# Patient Record
Sex: MALE | Race: WHITE | NOT HISPANIC OR LATINO | Employment: UNEMPLOYED | URBAN - METROPOLITAN AREA
[De-identification: names, ages, dates, MRNs, and addresses within clinical notes are randomized per-mention and may not be internally consistent; named-entity substitution may affect disease eponyms.]

---

## 2024-11-19 ENCOUNTER — HOSPITAL ENCOUNTER (OUTPATIENT)
Facility: HOSPITAL | Age: 34
Setting detail: OBSERVATION
Discharge: HOME | End: 2024-11-20
Attending: STUDENT IN AN ORGANIZED HEALTH CARE EDUCATION/TRAINING PROGRAM | Admitting: FAMILY MEDICINE
Payer: COMMERCIAL

## 2024-11-19 ENCOUNTER — APPOINTMENT (OUTPATIENT)
Dept: GENERAL RADIOLOGY | Facility: HOSPITAL | Age: 34
End: 2024-11-19
Payer: COMMERCIAL

## 2024-11-19 DIAGNOSIS — E11.10 DIABETIC KETOACIDOSIS WITHOUT COMA ASSOCIATED WITH TYPE 2 DIABETES MELLITUS: Primary | ICD-10-CM

## 2024-11-19 LAB
ALBUMIN SERPL-MCNC: 4.3 G/DL (ref 3.5–5.2)
ALBUMIN/GLOB SERPL: 1.9 G/DL
ALP SERPL-CCNC: 63 U/L (ref 39–117)
ALT SERPL W P-5'-P-CCNC: 14 U/L (ref 1–41)
ANION GAP SERPL CALCULATED.3IONS-SCNC: 11.8 MMOL/L (ref 5–15)
ANION GAP SERPL CALCULATED.3IONS-SCNC: 13.8 MMOL/L (ref 5–15)
ANION GAP SERPL CALCULATED.3IONS-SCNC: 8.9 MMOL/L (ref 5–15)
AST SERPL-CCNC: 12 U/L (ref 1–40)
BASOPHILS # BLD AUTO: 0.04 10*3/MM3 (ref 0–0.2)
BASOPHILS NFR BLD AUTO: 0.3 % (ref 0–1.5)
BILIRUB SERPL-MCNC: 0.8 MG/DL (ref 0–1.2)
BUN SERPL-MCNC: 10 MG/DL (ref 6–20)
BUN SERPL-MCNC: 11 MG/DL (ref 6–20)
BUN SERPL-MCNC: 9 MG/DL (ref 6–20)
BUN/CREAT SERPL: 10.5 (ref 7–25)
BUN/CREAT SERPL: 10.8 (ref 7–25)
BUN/CREAT SERPL: 9.6 (ref 7–25)
CALCIUM SPEC-SCNC: 7 MG/DL (ref 8.6–10.5)
CALCIUM SPEC-SCNC: 7.4 MG/DL (ref 8.6–10.5)
CALCIUM SPEC-SCNC: 8 MG/DL (ref 8.6–10.5)
CHLORIDE SERPL-SCNC: 106 MMOL/L (ref 98–107)
CHLORIDE SERPL-SCNC: 106 MMOL/L (ref 98–107)
CHLORIDE SERPL-SCNC: 108 MMOL/L (ref 98–107)
CO2 SERPL-SCNC: 14.2 MMOL/L (ref 22–29)
CO2 SERPL-SCNC: 15.2 MMOL/L (ref 22–29)
CO2 SERPL-SCNC: 18.1 MMOL/L (ref 22–29)
CREAT SERPL-MCNC: 0.93 MG/DL (ref 0.76–1.27)
CREAT SERPL-MCNC: 0.94 MG/DL (ref 0.76–1.27)
CREAT SERPL-MCNC: 1.05 MG/DL (ref 0.76–1.27)
DEPRECATED RDW RBC AUTO: 37.9 FL (ref 37–54)
EGFRCR SERPLBLD CKD-EPI 2021: 109.1 ML/MIN/1.73
EGFRCR SERPLBLD CKD-EPI 2021: 110.5 ML/MIN/1.73
EGFRCR SERPLBLD CKD-EPI 2021: 95.5 ML/MIN/1.73
EOSINOPHIL # BLD AUTO: 0.11 10*3/MM3 (ref 0–0.4)
EOSINOPHIL NFR BLD AUTO: 0.9 % (ref 0.3–6.2)
ERYTHROCYTE [DISTWIDTH] IN BLOOD BY AUTOMATED COUNT: 11.1 % (ref 12.3–15.4)
GLOBULIN UR ELPH-MCNC: 2.3 GM/DL
GLUCOSE BLDC GLUCOMTR-MCNC: 117 MG/DL (ref 70–99)
GLUCOSE BLDC GLUCOMTR-MCNC: 122 MG/DL (ref 70–99)
GLUCOSE BLDC GLUCOMTR-MCNC: 129 MG/DL (ref 70–99)
GLUCOSE BLDC GLUCOMTR-MCNC: 144 MG/DL (ref 70–99)
GLUCOSE BLDC GLUCOMTR-MCNC: 144 MG/DL (ref 70–99)
GLUCOSE BLDC GLUCOMTR-MCNC: 151 MG/DL (ref 70–99)
GLUCOSE BLDC GLUCOMTR-MCNC: 152 MG/DL (ref 70–99)
GLUCOSE BLDC GLUCOMTR-MCNC: 162 MG/DL (ref 70–99)
GLUCOSE BLDC GLUCOMTR-MCNC: 164 MG/DL (ref 70–99)
GLUCOSE BLDC GLUCOMTR-MCNC: 165 MG/DL (ref 70–99)
GLUCOSE BLDC GLUCOMTR-MCNC: 170 MG/DL (ref 70–99)
GLUCOSE BLDC GLUCOMTR-MCNC: 181 MG/DL (ref 70–99)
GLUCOSE BLDC GLUCOMTR-MCNC: 187 MG/DL (ref 70–99)
GLUCOSE BLDC GLUCOMTR-MCNC: 87 MG/DL (ref 70–99)
GLUCOSE SERPL-MCNC: 108 MG/DL (ref 65–99)
GLUCOSE SERPL-MCNC: 134 MG/DL (ref 65–99)
GLUCOSE SERPL-MCNC: 171 MG/DL (ref 65–99)
HBA1C MFR BLD: 8.7 % (ref 4.8–5.6)
HCT VFR BLD AUTO: 39 % (ref 37.5–51)
HGB BLD-MCNC: 14.2 G/DL (ref 13–17.7)
IMM GRANULOCYTES # BLD AUTO: 0.09 10*3/MM3 (ref 0–0.05)
IMM GRANULOCYTES NFR BLD AUTO: 0.8 % (ref 0–0.5)
LYMPHOCYTES # BLD AUTO: 0.91 10*3/MM3 (ref 0.7–3.1)
LYMPHOCYTES NFR BLD AUTO: 7.8 % (ref 19.6–45.3)
MAGNESIUM SERPL-MCNC: 1.7 MG/DL (ref 1.6–2.6)
MAGNESIUM SERPL-MCNC: 1.8 MG/DL (ref 1.6–2.6)
MAGNESIUM SERPL-MCNC: 1.9 MG/DL (ref 1.6–2.6)
MCH RBC QN AUTO: 33.8 PG (ref 26.6–33)
MCHC RBC AUTO-ENTMCNC: 36.4 G/DL (ref 31.5–35.7)
MCV RBC AUTO: 92.9 FL (ref 79–97)
MONOCYTES # BLD AUTO: 1.69 10*3/MM3 (ref 0.1–0.9)
MONOCYTES NFR BLD AUTO: 14.4 % (ref 5–12)
NEUTROPHILS NFR BLD AUTO: 75.8 % (ref 42.7–76)
NEUTROPHILS NFR BLD AUTO: 8.88 10*3/MM3 (ref 1.7–7)
NRBC BLD AUTO-RTO: 0 /100 WBC (ref 0–0.2)
OSMOLALITY SERPL: 286 MOSM/KG (ref 275–295)
PHOSPHATE SERPL-MCNC: 1 MG/DL (ref 2.5–4.5)
PHOSPHATE SERPL-MCNC: 1.4 MG/DL (ref 2.5–4.5)
PHOSPHATE SERPL-MCNC: 2.3 MG/DL (ref 2.5–4.5)
PLATELET # BLD AUTO: 266 10*3/MM3 (ref 140–450)
PMV BLD AUTO: 9.9 FL (ref 6–12)
POTASSIUM SERPL-SCNC: 3.6 MMOL/L (ref 3.5–5.2)
POTASSIUM SERPL-SCNC: 3.6 MMOL/L (ref 3.5–5.2)
POTASSIUM SERPL-SCNC: 3.8 MMOL/L (ref 3.5–5.2)
PROT SERPL-MCNC: 6.6 G/DL (ref 6–8.5)
RBC # BLD AUTO: 4.2 10*6/MM3 (ref 4.14–5.8)
SODIUM SERPL-SCNC: 133 MMOL/L (ref 136–145)
SODIUM SERPL-SCNC: 134 MMOL/L (ref 136–145)
SODIUM SERPL-SCNC: 135 MMOL/L (ref 136–145)
WBC NRBC COR # BLD AUTO: 11.72 10*3/MM3 (ref 3.4–10.8)

## 2024-11-19 PROCEDURE — 84100 ASSAY OF PHOSPHORUS: CPT | Performed by: STUDENT IN AN ORGANIZED HEALTH CARE EDUCATION/TRAINING PROGRAM

## 2024-11-19 PROCEDURE — 82948 REAGENT STRIP/BLOOD GLUCOSE: CPT

## 2024-11-19 PROCEDURE — G0378 HOSPITAL OBSERVATION PER HR: HCPCS

## 2024-11-19 PROCEDURE — 83735 ASSAY OF MAGNESIUM: CPT | Performed by: STUDENT IN AN ORGANIZED HEALTH CARE EDUCATION/TRAINING PROGRAM

## 2024-11-19 PROCEDURE — 25810000003 SODIUM CHLORIDE 0.9 % SOLUTION: Performed by: STUDENT IN AN ORGANIZED HEALTH CARE EDUCATION/TRAINING PROGRAM

## 2024-11-19 PROCEDURE — 94799 UNLISTED PULMONARY SVC/PX: CPT

## 2024-11-19 PROCEDURE — G0379 DIRECT REFER HOSPITAL OBSERV: HCPCS

## 2024-11-19 PROCEDURE — 96366 THER/PROPH/DIAG IV INF ADDON: CPT

## 2024-11-19 PROCEDURE — 80053 COMPREHEN METABOLIC PANEL: CPT | Performed by: STUDENT IN AN ORGANIZED HEALTH CARE EDUCATION/TRAINING PROGRAM

## 2024-11-19 PROCEDURE — 71045 X-RAY EXAM CHEST 1 VIEW: CPT

## 2024-11-19 PROCEDURE — 96365 THER/PROPH/DIAG IV INF INIT: CPT

## 2024-11-19 PROCEDURE — 96372 THER/PROPH/DIAG INJ SC/IM: CPT

## 2024-11-19 PROCEDURE — 25010000002 ENOXAPARIN PER 10 MG: Performed by: FAMILY MEDICINE

## 2024-11-19 PROCEDURE — 99223 1ST HOSP IP/OBS HIGH 75: CPT | Performed by: STUDENT IN AN ORGANIZED HEALTH CARE EDUCATION/TRAINING PROGRAM

## 2024-11-19 PROCEDURE — 85025 COMPLETE CBC W/AUTO DIFF WBC: CPT | Performed by: STUDENT IN AN ORGANIZED HEALTH CARE EDUCATION/TRAINING PROGRAM

## 2024-11-19 PROCEDURE — 63710000001 INSULIN GLARGINE PER 5 UNITS: Performed by: FAMILY MEDICINE

## 2024-11-19 PROCEDURE — 99291 CRITICAL CARE FIRST HOUR: CPT | Performed by: INTERNAL MEDICINE

## 2024-11-19 PROCEDURE — 83930 ASSAY OF BLOOD OSMOLALITY: CPT | Performed by: STUDENT IN AN ORGANIZED HEALTH CARE EDUCATION/TRAINING PROGRAM

## 2024-11-19 PROCEDURE — 83036 HEMOGLOBIN GLYCOSYLATED A1C: CPT | Performed by: STUDENT IN AN ORGANIZED HEALTH CARE EDUCATION/TRAINING PROGRAM

## 2024-11-19 PROCEDURE — 94761 N-INVAS EAR/PLS OXIMETRY MLT: CPT

## 2024-11-19 RX ORDER — SODIUM CHLORIDE 0.9 % (FLUSH) 0.9 %
10 SYRINGE (ML) INJECTION EVERY 12 HOURS SCHEDULED
Status: DISCONTINUED | OUTPATIENT
Start: 2024-11-19 | End: 2024-11-20 | Stop reason: HOSPADM

## 2024-11-19 RX ORDER — POLYETHYLENE GLYCOL 3350 17 G/17G
17 POWDER, FOR SOLUTION ORAL DAILY PRN
Status: DISCONTINUED | OUTPATIENT
Start: 2024-11-19 | End: 2024-11-20 | Stop reason: HOSPADM

## 2024-11-19 RX ORDER — SODIUM CHLORIDE 9 MG/ML
40 INJECTION, SOLUTION INTRAVENOUS AS NEEDED
Status: DISCONTINUED | OUTPATIENT
Start: 2024-11-19 | End: 2024-11-20 | Stop reason: HOSPADM

## 2024-11-19 RX ORDER — DEXTROSE MONOHYDRATE, SODIUM CHLORIDE, AND POTASSIUM CHLORIDE 50; 1.49; 4.5 G/1000ML; G/1000ML; G/1000ML
125 INJECTION, SOLUTION INTRAVENOUS CONTINUOUS PRN
Status: DISCONTINUED | OUTPATIENT
Start: 2024-11-19 | End: 2024-11-19

## 2024-11-19 RX ORDER — SODIUM CHLORIDE 0.9 % (FLUSH) 0.9 %
10 SYRINGE (ML) INJECTION AS NEEDED
Status: DISCONTINUED | OUTPATIENT
Start: 2024-11-19 | End: 2024-11-20 | Stop reason: HOSPADM

## 2024-11-19 RX ORDER — SODIUM CHLORIDE AND POTASSIUM CHLORIDE 150; 450 MG/100ML; MG/100ML
200 INJECTION, SOLUTION INTRAVENOUS CONTINUOUS PRN
Status: DISCONTINUED | OUTPATIENT
Start: 2024-11-19 | End: 2024-11-19

## 2024-11-19 RX ORDER — SODIUM CHLORIDE 450 MG/100ML
200 INJECTION, SOLUTION INTRAVENOUS CONTINUOUS PRN
Status: DISCONTINUED | OUTPATIENT
Start: 2024-11-19 | End: 2024-11-19

## 2024-11-19 RX ORDER — METFORMIN HYDROCHLORIDE 500 MG/1
2 TABLET, EXTENDED RELEASE ORAL 2 TIMES DAILY
COMMUNITY

## 2024-11-19 RX ORDER — INSULIN LISPRO 100 [IU]/ML
INJECTION, SOLUTION INTRAVENOUS; SUBCUTANEOUS
Status: ON HOLD | COMMUNITY
End: 2024-11-20

## 2024-11-19 RX ORDER — HEPARIN SODIUM 5000 [USP'U]/ML
5000 INJECTION, SOLUTION INTRAVENOUS; SUBCUTANEOUS EVERY 8 HOURS SCHEDULED
Status: DISCONTINUED | OUTPATIENT
Start: 2024-11-19 | End: 2024-11-19

## 2024-11-19 RX ORDER — AMOXICILLIN 250 MG
2 CAPSULE ORAL 2 TIMES DAILY PRN
Status: DISCONTINUED | OUTPATIENT
Start: 2024-11-19 | End: 2024-11-20 | Stop reason: HOSPADM

## 2024-11-19 RX ORDER — INSULIN LISPRO 100 [IU]/ML
2-7 INJECTION, SOLUTION INTRAVENOUS; SUBCUTANEOUS
Status: ACTIVE | OUTPATIENT
Start: 2024-11-19 | End: 2024-11-19

## 2024-11-19 RX ORDER — SODIUM CHLORIDE AND POTASSIUM CHLORIDE 150; 900 MG/100ML; MG/100ML
200 INJECTION, SOLUTION INTRAVENOUS CONTINUOUS PRN
Status: DISCONTINUED | OUTPATIENT
Start: 2024-11-19 | End: 2024-11-19

## 2024-11-19 RX ORDER — DEXTROSE MONOHYDRATE 25 G/50ML
25 INJECTION, SOLUTION INTRAVENOUS
Status: DISCONTINUED | OUTPATIENT
Start: 2024-11-19 | End: 2024-11-20 | Stop reason: HOSPADM

## 2024-11-19 RX ORDER — POTASSIUM CHLORIDE 1500 MG/1
40 TABLET, EXTENDED RELEASE ORAL EVERY 4 HOURS
Status: COMPLETED | OUTPATIENT
Start: 2024-11-19 | End: 2024-11-19

## 2024-11-19 RX ORDER — DEXTROSE MONOHYDRATE, SODIUM CHLORIDE, AND POTASSIUM CHLORIDE 50; 1.49; 9 G/1000ML; G/1000ML; G/1000ML
125 INJECTION, SOLUTION INTRAVENOUS CONTINUOUS PRN
Status: DISCONTINUED | OUTPATIENT
Start: 2024-11-19 | End: 2024-11-19

## 2024-11-19 RX ORDER — DEXTROSE MONOHYDRATE, SODIUM CHLORIDE, AND POTASSIUM CHLORIDE 50; 2.98; 4.5 G/1000ML; G/1000ML; G/1000ML
125 INJECTION, SOLUTION INTRAVENOUS CONTINUOUS PRN
Status: DISCONTINUED | OUTPATIENT
Start: 2024-11-19 | End: 2024-11-19

## 2024-11-19 RX ORDER — SODIUM CHLORIDE 9 MG/ML
200 INJECTION, SOLUTION INTRAVENOUS CONTINUOUS PRN
Status: DISCONTINUED | OUTPATIENT
Start: 2024-11-19 | End: 2024-11-19

## 2024-11-19 RX ORDER — DEXTROSE MONOHYDRATE 25 G/50ML
10-50 INJECTION, SOLUTION INTRAVENOUS
Status: DISCONTINUED | OUTPATIENT
Start: 2024-11-19 | End: 2024-11-20 | Stop reason: HOSPADM

## 2024-11-19 RX ORDER — NITROGLYCERIN 0.4 MG/1
0.4 TABLET SUBLINGUAL
Status: DISCONTINUED | OUTPATIENT
Start: 2024-11-19 | End: 2024-11-20 | Stop reason: HOSPADM

## 2024-11-19 RX ORDER — FENTANYL/ROPIVACAINE/NS/PF 2-625MCG/1
15 PLASTIC BAG, INJECTION (ML) EPIDURAL
Status: COMPLETED | OUTPATIENT
Start: 2024-11-19 | End: 2024-11-19

## 2024-11-19 RX ORDER — IBUPROFEN 600 MG/1
1 TABLET ORAL
Status: DISCONTINUED | OUTPATIENT
Start: 2024-11-19 | End: 2024-11-19

## 2024-11-19 RX ORDER — ENOXAPARIN SODIUM 100 MG/ML
40 INJECTION SUBCUTANEOUS NIGHTLY
Status: DISCONTINUED | OUTPATIENT
Start: 2024-11-19 | End: 2024-11-20 | Stop reason: HOSPADM

## 2024-11-19 RX ORDER — DEXTROSE MONOHYDRATE AND SODIUM CHLORIDE 5; .9 G/100ML; G/100ML
125 INJECTION, SOLUTION INTRAVENOUS CONTINUOUS PRN
Status: DISCONTINUED | OUTPATIENT
Start: 2024-11-19 | End: 2024-11-19

## 2024-11-19 RX ORDER — NICOTINE POLACRILEX 4 MG
15 LOZENGE BUCCAL
Status: DISCONTINUED | OUTPATIENT
Start: 2024-11-19 | End: 2024-11-20 | Stop reason: HOSPADM

## 2024-11-19 RX ORDER — SODIUM CHLORIDE AND POTASSIUM CHLORIDE 300; 900 MG/100ML; MG/100ML
200 INJECTION, SOLUTION INTRAVENOUS CONTINUOUS PRN
Status: DISCONTINUED | OUTPATIENT
Start: 2024-11-19 | End: 2024-11-19

## 2024-11-19 RX ORDER — BISACODYL 5 MG/1
5 TABLET, DELAYED RELEASE ORAL DAILY PRN
Status: DISCONTINUED | OUTPATIENT
Start: 2024-11-19 | End: 2024-11-20 | Stop reason: HOSPADM

## 2024-11-19 RX ORDER — DEXTROSE MONOHYDRATE AND SODIUM CHLORIDE 5; .45 G/100ML; G/100ML
125 INJECTION, SOLUTION INTRAVENOUS CONTINUOUS PRN
Status: DISCONTINUED | OUTPATIENT
Start: 2024-11-19 | End: 2024-11-19

## 2024-11-19 RX ORDER — BISACODYL 10 MG
10 SUPPOSITORY, RECTAL RECTAL DAILY PRN
Status: DISCONTINUED | OUTPATIENT
Start: 2024-11-19 | End: 2024-11-20 | Stop reason: HOSPADM

## 2024-11-19 RX ORDER — DEXTROSE MONOHYDRATE, SODIUM CHLORIDE, AND POTASSIUM CHLORIDE 50; 2.98; 9 G/1000ML; G/1000ML; G/1000ML
125 INJECTION, SOLUTION INTRAVENOUS CONTINUOUS PRN
Status: DISCONTINUED | OUTPATIENT
Start: 2024-11-19 | End: 2024-11-19

## 2024-11-19 RX ORDER — IBUPROFEN 600 MG/1
1 TABLET ORAL
Status: DISCONTINUED | OUTPATIENT
Start: 2024-11-19 | End: 2024-11-20 | Stop reason: HOSPADM

## 2024-11-19 RX ADMIN — Medication 10 ML: at 20:11

## 2024-11-19 RX ADMIN — Medication 10 ML: at 08:18

## 2024-11-19 RX ADMIN — POTASSIUM CHLORIDE 40 MEQ: 1500 TABLET, EXTENDED RELEASE ORAL at 08:17

## 2024-11-19 RX ADMIN — ENOXAPARIN SODIUM 40 MG: 100 INJECTION SUBCUTANEOUS at 20:10

## 2024-11-19 RX ADMIN — POTASSIUM CHLORIDE 40 MEQ: 1500 TABLET, EXTENDED RELEASE ORAL at 04:33

## 2024-11-19 RX ADMIN — INSULIN GLARGINE 10 UNITS: 100 INJECTION, SOLUTION SUBCUTANEOUS at 15:15

## 2024-11-19 RX ADMIN — POTASSIUM PHOSPHATE, MONOBASIC AND POTASSIUM PHOSPHATE, DIBASIC 15 MMOL: 224; 236 INJECTION, SOLUTION, CONCENTRATE INTRAVENOUS at 11:37

## 2024-11-19 RX ADMIN — POTASSIUM PHOSPHATE, MONOBASIC AND POTASSIUM PHOSPHATE, DIBASIC 15 MMOL: 224; 236 INJECTION, SOLUTION, CONCENTRATE INTRAVENOUS at 04:34

## 2024-11-19 RX ADMIN — POTASSIUM CHLORIDE, DEXTROSE MONOHYDRATE AND SODIUM CHLORIDE 125 ML/HR: 150; 5; 900 INJECTION, SOLUTION INTRAVENOUS at 03:40

## 2024-11-19 RX ADMIN — Medication 10 ML: at 20:12

## 2024-11-19 RX ADMIN — SODIUM CHLORIDE 1000 ML/HR: 9 INJECTION, SOLUTION INTRAVENOUS at 03:25

## 2024-11-19 RX ADMIN — INSULIN HUMAN 0.3 UNITS/HR: 1 INJECTION, SOLUTION INTRAVENOUS at 03:40

## 2024-11-19 RX ADMIN — POTASSIUM PHOSPHATE, MONOBASIC AND POTASSIUM PHOSPHATE, DIBASIC 15 MMOL: 224; 236 INJECTION, SOLUTION, CONCENTRATE INTRAVENOUS at 08:17

## 2024-11-19 NOTE — CONSULTS
"Nutrition Services    Patient Name: Juan Francisco Tenorio  YOB: 1990  MRN: [id number]  Admission date: 11/19/2024      CLINICAL NUTRITION ASSESSMENT      Reason for Assessment  MST Score 2+     H&P:  Past Medical History:   Diagnosis Date    Diabetes mellitus         Current Problems:   Active Hospital Problems    Diagnosis     **DKA (diabetic ketoacidosis)         Nutrition/Diet History         Narrative   Pt discussed during morning rounds, also met with pt and family in room. Pt reports normal CBG between 150-240, which he considered pretty good. Says he became ill earlier this week and was no longer able to get BG under control no matter what he did. Explained to pt that these numbers are high and talked about sources of sugar he had not been accounting for (sauces on foods, primarily). Pt and family had no questions. Concur with provider recommendation for outpatient follow up with DM educator.     Anthropometrics        Current Height, Weight Height: 185.4 cm (73\")  Weight: 69.9 kg (154 lb 1.6 oz)   Current BMI Body mass index is 20.33 kg/m².   BMI Classification Normal range   % IBW 87%   Adjusted Body Weight (ABW)    Weight Hx  Wt Readings from Last 30 Encounters:   11/19/24 0429 69.9 kg (154 lb 1.6 oz)   11/19/24 0311 69.9 kg (154 lb 1.6 oz)          Wt Change Observation No wt history present     Estimated/Assessed Needs  Estimated Needs based on: Current Body Weight       Energy Requirements 25-30 kcal/kg   EST Needs (kcal/day) 5564-0911       Protein Requirements 1.0-1.2 g/kg   EST Daily Needs (g/day) 70-84       Fluid Requirements 1 ml/kcal    Estimated Needs (mL/day) 5434-9847     Labs/Medications         Pertinent Labs Reviewed.   Results from last 7 days   Lab Units 11/19/24  1208 11/19/24  0736 11/19/24  0318   SODIUM mmol/L 135* 134* 133*   POTASSIUM mmol/L 3.6 3.8 3.6   CHLORIDE mmol/L 108* 106 106   CO2 mmol/L 18.1* 14.2* 15.2*   BUN mg/dL 9 10 11   CREATININE mg/dL 0.94 0.93 1.05   CALCIUM " "mg/dL 7.4* 7.0* 8.0*   BILIRUBIN mg/dL  --   --  0.8   ALK PHOS U/L  --   --  63   ALT (SGPT) U/L  --   --  14   AST (SGOT) U/L  --   --  12   GLUCOSE mg/dL 134* 171* 108*     Results from last 7 days   Lab Units 11/19/24  1208 11/19/24  0736 11/19/24  0318   MAGNESIUM mg/dL 1.8 1.7 1.9   PHOSPHORUS mg/dL 1.4* 2.3* 1.0*   HEMOGLOBIN g/dL  --   --  14.2   HEMATOCRIT %  --   --  39.0     No results found for: \"COVID19\"  Lab Results   Component Value Date    HGBA1C 8.70 (H) 11/19/2024         Pertinent Medications Reviewed.     Malnutrition Severity Assessment              Nutrition Diagnosis         Nutrition Dx Problem 1 Altered nutrition related lab values related to limited adherence to nutrition recommendations as evidenced by patient report., family report., information deficit., and HbA1c 8.7%     Nutrition Intervention           Current Nutrition Orders & Evaluation of Intake       Current PO Diet Diet: Diabetic; Consistent Carbohydrate; Fluid Consistency: Thin (IDDSI 0)   Supplement Orders Placed This Encounter      Patient is on Glucommander           Nutrition Intervention/Prescription        Continue current Carb consistent diet order    Recommend outpatient follow up with DM educator        Medical Nutrition Therapy/Nutrition Education          Learner     Readiness Patient, Family, and Significant Other  Eager and Acceptance     Method     Response Explanation  Verbalizes understanding     Monitor/Evaluation        Monitor PO intake, Pertinent labs, GI status     Nutrition Discharge Plan         Recommend outpatient DM educator follow up after discharge     Electronically signed by:  Albania Hui RD  11/19/24 15:17 EST    "

## 2024-11-19 NOTE — PLAN OF CARE
Goal Outcome Evaluation:           Progress: improving  Outcome Evaluation: Patient off insulin drip at 1715. Patient wife has brought patient's insulin pump, and Dexcom. Dr. Alvarez wants patient to put on insulin pump on at 2100.Patient tolerating diet. Awaiting transfer to Med Surg.

## 2024-11-19 NOTE — H&P
[facility]  [facility]   Patient Name: Juan Francisco Tenorio  : 1990  MRN: [id number]  Primary Care Physician:  Provider, No Known  Date of admission: 2024    Subjective   Subjective     Chief Complaint: Abdominal pain, nausea, vomiting, DKA    HPI:    Juan Francisco Tenorio is a 34 y.o. male with a past medical history of insulin-dependent type 2 diabetes mellitus on insulin pump, hypertension, presented to the ED at the outside facility for evaluation of abdominal pain,.  Patients insulin pump ran out of refills and patient was taking sliding scale insulin at home and was not able to get the blood glucose under control.  Therefore yesterday patient started experiencing severe abdominal pain, nausea, vomiting and went to the ED at the outside facility for further evaluation.  Labs at outside facility was significant for DKA and started on insulin pump.  Request was made to transfer the patient for further evaluation and management of DKA.    Patient denies any abdominal pain, nausea, vomiting at this time.  Denies any fevers, chills, chest pain, diarrhea, dysuria, trouble breathing prior to arrival to the ED.      Personal History     Past Medical History:   Diagnosis Date    Diabetes mellitus          History reviewed. No pertinent surgical history.      History reviewed. No pertinent family history.      Social History     Socioeconomic History    Marital status: Single   Tobacco Use    Smoking status: Never    Smokeless tobacco: Current     Types: Chew   Vaping Use    Vaping status: Never Used   Substance and Sexual Activity    Alcohol use: Yes     Alcohol/week: 6.0 standard drinks of alcohol     Types: 6 Cans of beer per week    Drug use: Never         Home Medications:       Allergies:  No Known Allergies      Objective   Objective     Vitals:   Temp:  [97.9 °F (36.6 °C)] 97.9 °F (36.6 °C)  Heart Rate:  [84-87] 85  Resp:  [15-16] 15  BP: (107-125)/(64-78)  109/67    Physical Exam    Constitutional: Awake, alert, no acute distress   Eyes: Pupils equal, sclerae anicteric, no conjunctival injection   HENT: NCAT, mucous membranes moist   Respiratory: Clear to auscultation bilaterally, nonlabored respirations    Cardiovascular: RRR, no murmurs, rubs, or gallops, palpable pedal pulses bilaterally   Gastrointestinal: Positive bowel sounds, soft, nontender, nondistended   Musculoskeletal: No bilateral ankle edema, no clubbing or cyanosis to extremities   Neurologic: Oriented x 3, speech clear   Skin: No rashes     Result Review    Result Review:  I have personally reviewed the results from the time of this admission to 11/19/2024 05:19 EST and agree with these findings:  [x]  Laboratory  []  Microbiology  []  Radiology  []  EKG/Telemetry   []  Cardiology/Vascular   []  Pathology  []  Old records  []  Other:        Imaging Results (Last 24 Hours)       Procedure Component Value Units Date/Time    XR Chest 1 View [[id number]] Collected: 11/19/24 0334     Updated: 11/19/24 0340    Narrative:      XR CHEST 1 VW-     Date of exam: 11/19/2024, 3:21 A.M.     Comparison: None available.     Indication: Assess for fluid overload; cough.      FINDINGS:  Two (2) AP upright portable chest radiographs reveal no cardiac  enlargement and no acute infiltrate. No pneumothorax is seen. No pleural  effusion.          Impression:      No acute cardiopulmonary disease is seen radiographically.           Portions of this note were completed with a voice recognition program.     Electronically Signed By-Elmer Oneil MD On:11/19/2024 3:38 AM                heparin (porcine), 5,000 Units, Subcutaneous, Q8H  potassium chloride ER, 40 mEq, Oral, Q4H  potassium phosphate, 15 mmol, Intravenous, Q3H  sodium chloride, 10 mL, Intravenous, Q12H  sodium chloride, 10 mL, Intravenous, Q12H         Assessment & Plan   Assessment / Plan     Assessment/Plan:   DKA  Anion gap metabolic  acidosis  Hypophosphatemia  Type 2 diabetes mellitus  Hypertension    Plan  Admit to inpatient, critical care  Intensivist consult in the a.m.  Insulin pump per DKA protocol  Monitor electrolytes, renal function with BMP every 4 hours  Monitor urine output  N.p.o.  Resume other appropriate home medications once reconciled    VTE Prophylaxis:  Pharmacologic VTE prophylaxis orders are present.    CODE STATUS:    Level Of Support Discussed With: Patient  Code Status (Patient has no pulse and is not breathing): CPR (Attempt to Resuscitate)  Medical Interventions (Patient has pulse or is breathing): Full Support      Admission Status:  I believe this patient meets inpatient status.    Part of this note may be an electronic transcription/translation of spoken language to printed text using the Dragon Dictation System    Kera Birch MD

## 2024-11-19 NOTE — PLAN OF CARE
Goal Outcome Evaluation:  Plan of Care Reviewed With: patient, spouse        Progress: no change  Outcome Evaluation: New admit overnight. CCU at 0300. Insulin gtt per glucommander. Fluid exchange per labs. Blood Glucose monitored. Electrolytes replaced per electrolyte protocol. No complaints of pain. VSS.

## 2024-11-19 NOTE — CONSULTS
Consult placed per On Insulin Drip for DKA or HHS. Met with patient and wife at bedside. Patient wears an Omnipod 5 and Dexcom G6 at home. He states he ran out of Dexcoms at home and his Omnipod was functioning on manual mode. He states he starting feeling sick after a couple of days which lead to his current hospitalization.     Patient has insulin, Omnipod pod, and Dexcom sensors at bedside. He does not have the Omnipod controller or the Dexcom transmitter. He believes his wife may have thrown his transmitter away. Wife has left to go looking for these devices so we can transition him off the drip.    Explained that we can transition him to Lantus and SSI in the meantime if that is what the provider wants. MD notified of missing supplies. I will return in the morning and we can place insulin pump and sign contract at that time. I'm unable to see his pump settings without his controller. No further needs or questions at this time.

## 2024-11-19 NOTE — CONSULTS
Pulmonary / Critical Care Consult Note      Patient Name: Juan Francisco Tenorio  : 1990  MRN: [id number]  Primary Care Physician:  Provider, No Known  Referring Physician: Eddie Martin MD  Date of admission: 2024    Subjective   Subjective     Reason for Consult/ Chief Complaint: DKA, nausea vomiting, abdominal pain    HPI:  Juan Francisco Tenorio is a 34 y.o. male with past ministry of insulin-dependent type 2 diabetes on insulin pump, hypertension who presented to the emergency room of an outside facility secondary to abdominal pain.  Per report patient's insulin pump ran out of refills and patient was taken sliding-scale insulin at home and was not able to maintain blood glucose with good control.  On  patient started experiencing severe abdominal pain, nausea and vomiting and went to the emergency room for further evaluation.  Patient was with glucose per report of 500 was started on insulin drip and DKA protocol.  Transfer was made to send patient to [facility] given DKA.  This morning patient is without anion gap and point-of-care glucose has been in the 150s to 190s.  Patient reports no further nausea or vomiting and abdominal pain has improved.  Critical care has been consulted for further evaluation management while in the CCU.  He is currently on insulin drip.  He has OmniPod at home.  He reports noncompliance with medications when he had worsening symptoms.     Review of Systems  Abdominal pain, nausea and vomiting          Personal History     Past Medical History:   Diagnosis Date    Diabetes mellitus        History reviewed. No pertinent surgical history.    Family History: No known family history of chronic lung or heart disease.     Social History:  reports that he has never smoked. His smokeless tobacco use includes chew. He reports current alcohol use of about 6.0 standard drinks of alcohol per week. He reports that he does not use drugs.    Home Medications:       Allergies:  No Known  Allergies    Objective    Objective     Vitals:   Temp:  [97.9 °F (36.6 °C)] 97.9 °F (36.6 °C)  Heart Rate:  [78-87] 86  Resp:  [12-16] 12  BP: (105-125)/(58-78) 105/58    Physical Exam:  Vital Signs Reviewed   WDWN, Alert, NAD  HEENT:  PERRL, EOMI.  OP, MMM  Chest:  good aeration, clear to auscultation bilaterally,no work of breathing noted  CV: RRR, no MGR, pulses 2+, equal  Abd:  Soft, NT, ND, + BS, no HSM  EXT:  no clubbing, no cyanosis, no edema, no joint tenderness  Neuro:  A&Ox3, CN grossly intact, no focal deficits  Skin: No rashes or lesions noted      Result Review    Result Review:  I have personally reviewed the results from the time of this admission to 11/19/2024 07:29 EST and agree with these findings:  [x]  Laboratory  [x]  Microbiology  [x]  Radiology  [x]  EKG/Telemetry   [x]  Cardiology/Vascular   []  Pathology  []  Old records  []  Other:  Most notable findings include:       Lab 11/19/24  0736 11/19/24  0318   WBC  --  11.72*   HEMOGLOBIN  --  14.2   HEMATOCRIT  --  39.0   PLATELETS  --  266   SODIUM 134* 133*   POTASSIUM 3.8 3.6   CHLORIDE 106 106   CO2 14.2* 15.2*   BUN 10 11   CREATININE 0.93 1.05   GLUCOSE 171* 108*   CALCIUM 7.0* 8.0*   PHOSPHORUS 2.3* 1.0*   TOTAL PROTEIN  --  6.6   ALBUMIN  --  4.3   GLOBULIN  --  2.3         Assessment & Plan   Assessment / Plan     Active Hospital Problems:  Active Hospital Problems    Diagnosis     **DKA (diabetic ketoacidosis)          Impression:  DKA  Volume depletion  Medical noncompliance  Abdominal pain nausea vomiting secondary to the above  Type 2 diabetes mellitus insulin-dependent  Hypertension      Plan:  Currently on insulin drip with DKA protocol  Anion gap closed, nausea vomiting abdominal pain have improved  Start diet  Patient uses insulin pump at home.  Patient did not bring supplies to hospital  Consult diabetic educator  Check BMP every 4 hours.   Trend renal function electrolytes.  Replace as needed.   Resume home medications per  primary.       I, JESE Boothe spent 12 minutes in accordance with split shared billing.  Electronically signed by JESE Chacon, 11/19/24, 11:52 AM EST.      Patient is critically ill in ICU with DKA, abdominal pain, nausea, vomiting, type 2 diabetes mellitus insulin-dependent, hypertension  We spent 34 minutes of critical care time, excluding any procedure notes, in review, analysis, obtaining history and physical, formulating care plan, and I led multi-disciplinary critical care rounds with bedside nurse, respiratory therapist, clinical pharmacist and other allied services. I have discussed the case with primary service and other consultants as well. I, Dr Prado, spent 22 mins of critical care time according to split shared critical care billing guidelines.     Electronically signed by Conor Prado MD, 11/19/2024, 07:29 EST.

## 2024-11-19 NOTE — PLAN OF CARE
Problem: Adult Inpatient Plan of Care  Goal: Optimal Comfort and Wellbeing  Outcome: Progressing  Intervention: Provide Person-Centered Care  Recent Flowsheet Documentation  Taken 11/19/2024 0300 by Ashleigh Cody, RN  Trust Relationship/Rapport:   care explained   choices provided   emotional support provided   empathic listening provided   questions answered   questions encouraged   reassurance provided  Goal: Readiness for Transition of Care  Outcome: Progressing  Intervention: Mutually Develop Transition Plan  Recent Flowsheet Documentation  Taken 11/19/2024 0315 by Ashleigh Cody, RN  Transportation Anticipated: family or friend will provide  Patient/Family Anticipated Services at Transition: none  Patient/Family Anticipates Transition to: home  Taken 11/19/2024 0312 by Ashleigh Cody, RN  Equipment Currently Used at Home: none     Problem: Diabetic Ketoacidosis  Goal: Optimal Coping  Outcome: Progressing  Goal: Stabilization of Fluid, Electrolyte and Acid-Base Balance  Outcome: Progressing   Goal Outcome Evaluation:

## 2024-11-20 ENCOUNTER — READMISSION MANAGEMENT (OUTPATIENT)
Dept: CALL CENTER | Facility: HOSPITAL | Age: 34
End: 2024-11-20
Payer: COMMERCIAL

## 2024-11-20 VITALS
HEIGHT: 73 IN | BODY MASS INDEX: 22.21 KG/M2 | OXYGEN SATURATION: 99 % | SYSTOLIC BLOOD PRESSURE: 119 MMHG | WEIGHT: 167.55 LBS | TEMPERATURE: 98.6 F | DIASTOLIC BLOOD PRESSURE: 78 MMHG | HEART RATE: 74 BPM | RESPIRATION RATE: 18 BRPM

## 2024-11-20 LAB
ALBUMIN SERPL-MCNC: 3.7 G/DL (ref 3.5–5.2)
ANION GAP SERPL CALCULATED.3IONS-SCNC: 8.2 MMOL/L (ref 5–15)
BASOPHILS # BLD AUTO: 0.03 10*3/MM3 (ref 0–0.2)
BASOPHILS NFR BLD AUTO: 0.9 % (ref 0–1.5)
BUN SERPL-MCNC: 7 MG/DL (ref 6–20)
BUN/CREAT SERPL: 9.7 (ref 7–25)
CALCIUM SPEC-SCNC: 8.3 MG/DL (ref 8.6–10.5)
CHLORIDE SERPL-SCNC: 105 MMOL/L (ref 98–107)
CO2 SERPL-SCNC: 24.8 MMOL/L (ref 22–29)
CREAT SERPL-MCNC: 0.72 MG/DL (ref 0.76–1.27)
DEPRECATED RDW RBC AUTO: 38 FL (ref 37–54)
EGFRCR SERPLBLD CKD-EPI 2021: 122.9 ML/MIN/1.73
EOSINOPHIL # BLD AUTO: 0.15 10*3/MM3 (ref 0–0.4)
EOSINOPHIL NFR BLD AUTO: 4.3 % (ref 0.3–6.2)
ERYTHROCYTE [DISTWIDTH] IN BLOOD BY AUTOMATED COUNT: 11.1 % (ref 12.3–15.4)
GLUCOSE SERPL-MCNC: 212 MG/DL (ref 65–99)
HCT VFR BLD AUTO: 35 % (ref 37.5–51)
HGB BLD-MCNC: 12.5 G/DL (ref 13–17.7)
IMM GRANULOCYTES # BLD AUTO: 0.01 10*3/MM3 (ref 0–0.05)
IMM GRANULOCYTES NFR BLD AUTO: 0.3 % (ref 0–0.5)
LYMPHOCYTES # BLD AUTO: 0.99 10*3/MM3 (ref 0.7–3.1)
LYMPHOCYTES NFR BLD AUTO: 28.7 % (ref 19.6–45.3)
MAGNESIUM SERPL-MCNC: 1.9 MG/DL (ref 1.6–2.6)
MCH RBC QN AUTO: 33.2 PG (ref 26.6–33)
MCHC RBC AUTO-ENTMCNC: 35.7 G/DL (ref 31.5–35.7)
MCV RBC AUTO: 92.8 FL (ref 79–97)
MONOCYTES # BLD AUTO: 0.47 10*3/MM3 (ref 0.1–0.9)
MONOCYTES NFR BLD AUTO: 13.6 % (ref 5–12)
NEUTROPHILS NFR BLD AUTO: 1.8 10*3/MM3 (ref 1.7–7)
NEUTROPHILS NFR BLD AUTO: 52.2 % (ref 42.7–76)
NRBC BLD AUTO-RTO: 0 /100 WBC (ref 0–0.2)
PHOSPHATE SERPL-MCNC: 2.5 MG/DL (ref 2.5–4.5)
PLATELET # BLD AUTO: 173 10*3/MM3 (ref 140–450)
PMV BLD AUTO: 10.3 FL (ref 6–12)
POTASSIUM SERPL-SCNC: 3.2 MMOL/L (ref 3.5–5.2)
RBC # BLD AUTO: 3.77 10*6/MM3 (ref 4.14–5.8)
SODIUM SERPL-SCNC: 138 MMOL/L (ref 136–145)
WBC NRBC COR # BLD AUTO: 3.45 10*3/MM3 (ref 3.4–10.8)

## 2024-11-20 PROCEDURE — 80069 RENAL FUNCTION PANEL: CPT | Performed by: INTERNAL MEDICINE

## 2024-11-20 PROCEDURE — G0378 HOSPITAL OBSERVATION PER HR: HCPCS

## 2024-11-20 PROCEDURE — 85025 COMPLETE CBC W/AUTO DIFF WBC: CPT | Performed by: INTERNAL MEDICINE

## 2024-11-20 PROCEDURE — 94799 UNLISTED PULMONARY SVC/PX: CPT

## 2024-11-20 PROCEDURE — 90656 IIV3 VACC NO PRSV 0.5 ML IM: CPT | Performed by: STUDENT IN AN ORGANIZED HEALTH CARE EDUCATION/TRAINING PROGRAM

## 2024-11-20 PROCEDURE — G0008 ADMIN INFLUENZA VIRUS VAC: HCPCS | Performed by: STUDENT IN AN ORGANIZED HEALTH CARE EDUCATION/TRAINING PROGRAM

## 2024-11-20 PROCEDURE — 25010000002 INFLUENZA VIRUS VACC SPLIT PF 0.5 ML SUSPENSION PREFILLED SYRINGE: Performed by: STUDENT IN AN ORGANIZED HEALTH CARE EDUCATION/TRAINING PROGRAM

## 2024-11-20 PROCEDURE — 83735 ASSAY OF MAGNESIUM: CPT | Performed by: PHYSICIAN ASSISTANT

## 2024-11-20 PROCEDURE — 99238 HOSP IP/OBS DSCHRG MGMT 30/<: CPT | Performed by: FAMILY MEDICINE

## 2024-11-20 RX ORDER — INSULIN LISPRO 100 [IU]/ML
5 INJECTION, SOLUTION INTRAVENOUS; SUBCUTANEOUS
Qty: 15 ML | Refills: 0 | Status: SHIPPED | OUTPATIENT
Start: 2024-11-20

## 2024-11-20 RX ORDER — POTASSIUM CHLORIDE 750 MG/1
40 CAPSULE, EXTENDED RELEASE ORAL EVERY 4 HOURS
Status: COMPLETED | OUTPATIENT
Start: 2024-11-20 | End: 2024-11-20

## 2024-11-20 RX ADMIN — POTASSIUM CHLORIDE 40 MEQ: 750 CAPSULE, EXTENDED RELEASE ORAL at 08:28

## 2024-11-20 RX ADMIN — Medication 10 ML: at 08:29

## 2024-11-20 RX ADMIN — POTASSIUM CHLORIDE 40 MEQ: 750 CAPSULE, EXTENDED RELEASE ORAL at 11:17

## 2024-11-20 RX ADMIN — INFLUENZA A VIRUS A/VICTORIA/4897/2022 IVR-238 (H1N1) ANTIGEN (FORMALDEHYDE INACTIVATED), INFLUENZA A VIRUS A/CALIFORNIA/122/2022 SAN-022 (H3N2) ANTIGEN (FORMALDEHYDE INACTIVATED), AND INFLUENZA B VIRUS B/MICHIGAN/01/2021 ANTIGEN (FORMALDEHYDE INACTIVATED) 0.5 ML: 15; 15; 15 INJECTION, SUSPENSION INTRAMUSCULAR at 13:24

## 2024-11-20 NOTE — OUTREACH NOTE
Prep Survey      Flowsheet Row Responses   [facility] facility patient discharged from? [facility]   Is LACE score < 7 ? Yes   Eligibility Not Eligible   What are the reasons patient is not eligible? Other  [Low risk for readmission]   Does the patient have one of the following disease processes/diagnoses(primary or secondary)? Other   Prep survey completed? Yes            Marisol LUQUE - Registered Nurse

## 2024-11-20 NOTE — PLAN OF CARE
Goal Outcome Evaluation:                 VSS and alert and oriented this shift. Blood sugar 88 before breakfast and 178 after lunch per dexcom. Pt self controlling insulin pump. No s/s of distress. Sitting on side of bed for meals and watching television. Anticipating discharge.

## 2024-11-20 NOTE — PLAN OF CARE
Goal Outcome Evaluation:  Plan of Care Reviewed With: patient        Progress: improving  Outcome Evaluation: Pt transferred to 3008. Vitals stable. Pt applied his dexcom and omnipod. Pt signed consent to manage his blood sugars and self insulin administration. At 2114, pt dexcom read 322 and omnipod was administering 5.9u. At 0415, pt dexcom read 67 with no insulin on board, pt asymptomatic and drank some coke which immediately raised his sugar to 73. Pt then ate a turkey sandwich and chips. No complaints of pain. Will document any changes.

## 2024-11-20 NOTE — PROGRESS NOTES
Patient seen and evaluated this afternoon lying in bed appears to be resting comfortably.  Anion gap remains closed.  Acidosis improved.  Tolerating a diet.  Transitioning to basal bolus insulin.  Patient has pump supplies at the bedside.  Discussed with patient and nursing that patient can restart home insulin pump this evening but will keep a close eye on blood sugars due to concern for possible hypoglycemia.  Diabetes educator consulted thank you for your assistance.  If blood sugars remain stable overnight and still tolerating diet in a.m. can likely discharge home with insulin refills to follow-up with diabetes management clinic as an outpatient.

## 2024-11-20 NOTE — CONSULTS
Met with patient and wife at bedside. Unable to find prior insulin pump contract. Completed and signed new pump contract. Patient wearing insulin pump and CGM as of last night. No needs or questions at this time.

## 2024-11-20 NOTE — DISCHARGE SUMMARY
[facility]         HOSPITALIST  DISCHARGE SUMMARY    Patient Name: Juan Francisco Tenorio  : 1990  MRN: [id number]    Date of Admission: 2024  Date of Discharge: 2024  Primary Care Physician: Anupama Lay APRN    Consults       Date and Time Order Name Status Description    2024  5:19 AM Inpatient Pulmonology Consult Completed             Active and Resolved Hospital Problems:  DKA  Anion gap metabolic acidosis  Hypophosphatemia  Type 2 diabetes mellitus  Hypertension  Active Hospital Problems    Diagnosis POA    **DKA (diabetic ketoacidosis) [E11.10] Yes      Resolved Hospital Problems   No resolved problems to display.       Hospital Course     Hospital Course:  Juan Francisco Tenorio is a 34 y.o. male with a past medical history of insulin-dependent type 2 diabetes mellitus on insulin pump, hypertension, presented to the ED at the outside facility for evaluation of abdominal pain,.  Patients insulin pump ran out of refills and patient was taking sliding scale insulin at home and was not able to get the blood glucose under control.  Therefore yesterday patient started experiencing severe abdominal pain, nausea, vomiting and went to the ED at the outside facility for further evaluation.  Labs at outside facility was significant for DKA and started on insulin pump.  Request was made to transfer the patient for further evaluation and management of DKA.  Continued on insulin drip and IV fluids per DKA protocol.  Anion gap closed.  Acidosis improved.  Electrolytes replaced per protocol.  Abdominal pain resolved.  Tolerating a diet.  Transitioned from insulin drip to home insulin pump.  Blood sugars remain well-controlled.  Patient seen and evaluated on date of discharge and thought stable for discharge home to follow-up with his primary care provider and diabetes care clinic as an outpatient.        DISCHARGE Follow Up Recommendations for labs and diagnostics: As above      Day of  Discharge     Vital Signs:  Temp:  [98 °F (36.7 °C)-98.3 °F (36.8 °C)] 98.1 °F (36.7 °C)  Heart Rate:  [71-95] 71  Resp:  [18] 18  BP: (103-137)/(67-89) 103/87  Physical Exam:   Gen. well-developed appearing stated age in no acute distress  HEENT: Normocephalic atraumatic moist membranes pupils equal round reactive light, no scleral icterus no conjunctival injection  Cardiovascular: regular rate and rhythm no murmurs rubs or gallops S1-S2, no lower extremity edema appreciated  Pulmonary: Clear to auscultation bilaterally no wheezes rales or rhonchi symmetric chest expansion, unlabored, no conversational dyspnea appreciated  Gastrointestinal: Soft nontender nondistended positive bowel sounds all 4 quadrants no rebound or guarding  Musculoskeletal: No clubbing cyanosis, warm and well-perfused, calves soft symmetric nontender bilaterally  Skin: Clean dry without rashs  Neuro: Cranial nerves II through XII intact grossly no sensorimotor deficits appreciated bilateral upper and lower extremities  Psych: Patient is calm cooperative and appropriate with exam not responding to internal stimuli  : No Mcghee catheter no bladder distention no suprapubic tenderness      Discharge Details        Discharge Medications        Changes to Medications        Instructions Start Date   Insulin Lispro (1 Unit Dial) 100 UNIT/ML solution pen-injector  Commonly known as: HumaLOG KwikPen  What changed:   how much to take  when to take this   5 Units, Subcutaneous, 3 Times Daily Before Meals, Used to fill insulin pump             Continue These Medications        Instructions Start Date   insulin patient supplied pump   Continuous      metFORMIN  MG 24 hr tablet  Commonly known as: GLUCOPHAGE-XR   2 tablets, 2 Times Daily               No Known Allergies    Discharge Disposition:  Home or Self Care    Diet:  Hospital:  Diet Order   Procedures    Diet: Diabetic; Consistent Carbohydrate; Fluid Consistency: Thin (IDDSI 0)       Discharge  Activity:   Activity Instructions       Gradually Increase Activity Until at Pre-Hospitalization Level              CODE STATUS:  Code Status and Medical Interventions: CPR (Attempt to Resuscitate); Full Support   Ordered at: 11/19/24 0341     Level Of Support Discussed With:    Patient     Code Status (Patient has no pulse and is not breathing):    CPR (Attempt to Resuscitate)     Medical Interventions (Patient has pulse or is breathing):    Full Support         No future appointments.    Additional Instructions for the Follow-ups that You Need to Schedule       Ambulatory Referral to [facility]   As directed      Hospital discharge follow up DKA    Diagnosis (search or select): Type 2 diabetes mellitus with hyperglycemia [561225]   Services Requested: Provider Consultation   Provider orders: Evaluate for and manage Insulin Pump Therapy        Discharge Follow-up with PCP   As directed       Currently Documented PCP:    Anupama Lay APRN    PCP Phone Number:    [phone]     Follow Up Details: Hospital discharge follow up 1-2 weeks                Pertinent  and/or Most Recent Results     PROCEDURES:   None    LAB RESULTS:      Lab 11/20/24  0549 11/19/24 0318   WBC 3.45 11.72*   HEMOGLOBIN 12.5* 14.2   HEMATOCRIT 35.0* 39.0   PLATELETS 173 266   NEUTROS ABS 1.80 8.88*   IMMATURE GRANS (ABS) 0.01 0.09*   LYMPHS ABS 0.99 0.91   MONOS ABS 0.47 1.69*   EOS ABS 0.15 0.11   MCV 92.8 92.9         Lab 11/20/24  0549 11/19/24  1208 11/19/24  0736 11/19/24 0318   SODIUM 138 135* 134* 133*   POTASSIUM 3.2* 3.6 3.8 3.6   CHLORIDE 105 108* 106 106   CO2 24.8 18.1* 14.2* 15.2*   ANION GAP 8.2 8.9 13.8 11.8   BUN 7 9 10 11   CREATININE 0.72* 0.94 0.93 1.05   EGFR 122.9 109.1 110.5 95.5   GLUCOSE 212* 134* 171* 108*   CALCIUM 8.3* 7.4* 7.0* 8.0*   MAGNESIUM 1.9 1.8 1.7 1.9   PHOSPHORUS 2.5 1.4* 2.3* 1.0*   HEMOGLOBIN A1C  --   --   --  8.70*         Lab 11/20/24  0549 11/19/24  0318   TOTAL  PROTEIN  --  6.6   ALBUMIN 3.7 4.3   GLOBULIN  --  2.3   ALT (SGPT)  --  14   AST (SGOT)  --  12   BILIRUBIN  --  0.8   ALK PHOS  --  63                     Brief Urine Lab Results       None          Microbiology Results (last 10 days)       ** No results found for the last 240 hours. **            XR Chest 1 View    Result Date: 11/19/2024  Impression: No acute cardiopulmonary disease is seen radiographically.    Portions of this note were completed with a voice recognition program.  Electronically Signed By-Elmer Oneil MD On:11/19/2024 3:38 AM                   Labs Pending at Discharge:        Time spent on Discharge including face to face service: 25 minutes    Electronically signed by Eric Alvarez MD, 11/20/24, 8:47 AM EST.

## 2025-07-30 ENCOUNTER — TRANSCRIBE ORDERS (OUTPATIENT)
Dept: DIABETES SERVICES | Facility: HOSPITAL | Age: 35
End: 2025-07-30
Payer: COMMERCIAL

## 2025-07-30 DIAGNOSIS — E11.9 TYPE 2 DIABETES MELLITUS WITHOUT COMPLICATION, UNSPECIFIED WHETHER LONG TERM INSULIN USE: Primary | ICD-10-CM
